# Patient Record
Sex: MALE | Race: WHITE | NOT HISPANIC OR LATINO | Employment: FULL TIME | URBAN - METROPOLITAN AREA
[De-identification: names, ages, dates, MRNs, and addresses within clinical notes are randomized per-mention and may not be internally consistent; named-entity substitution may affect disease eponyms.]

---

## 2019-09-08 ENCOUNTER — HOSPITAL ENCOUNTER (EMERGENCY)
Facility: HOSPITAL | Age: 34
Discharge: HOME | End: 2019-09-08
Attending: EMERGENCY MEDICINE | Admitting: EMERGENCY MEDICINE

## 2019-09-08 VITALS
TEMPERATURE: 97.5 F | SYSTOLIC BLOOD PRESSURE: 115 MMHG | BODY MASS INDEX: 28.04 KG/M2 | HEART RATE: 96 BPM | DIASTOLIC BLOOD PRESSURE: 74 MMHG | RESPIRATION RATE: 18 BRPM | HEIGHT: 68 IN | OXYGEN SATURATION: 100 % | WEIGHT: 185 LBS

## 2019-09-08 DIAGNOSIS — L02.91 ABSCESS: Primary | ICD-10-CM

## 2019-09-08 PROCEDURE — 87186 SC STD MICRODIL/AGAR DIL: CPT | Performed by: PHYSICIAN ASSISTANT

## 2019-09-08 PROCEDURE — 25010000002 MORPHINE PER 10 MG: Performed by: EMERGENCY MEDICINE

## 2019-09-08 PROCEDURE — 96374 THER/PROPH/DIAG INJ IV PUSH: CPT

## 2019-09-08 PROCEDURE — 87147 CULTURE TYPE IMMUNOLOGIC: CPT | Performed by: PHYSICIAN ASSISTANT

## 2019-09-08 PROCEDURE — 96375 TX/PRO/DX INJ NEW DRUG ADDON: CPT

## 2019-09-08 PROCEDURE — 99283 EMERGENCY DEPT VISIT LOW MDM: CPT

## 2019-09-08 PROCEDURE — 25010000002 ONDANSETRON PER 1 MG: Performed by: PHYSICIAN ASSISTANT

## 2019-09-08 PROCEDURE — 87070 CULTURE OTHR SPECIMN AEROBIC: CPT | Performed by: PHYSICIAN ASSISTANT

## 2019-09-08 PROCEDURE — 87205 SMEAR GRAM STAIN: CPT | Performed by: PHYSICIAN ASSISTANT

## 2019-09-08 RX ORDER — MORPHINE SULFATE 2 MG/ML
4 INJECTION, SOLUTION INTRAMUSCULAR; INTRAVENOUS ONCE
Status: COMPLETED | OUTPATIENT
Start: 2019-09-08 | End: 2019-09-08

## 2019-09-08 RX ORDER — SODIUM CHLORIDE 0.9 % (FLUSH) 0.9 %
10 SYRINGE (ML) INJECTION AS NEEDED
Status: DISCONTINUED | OUTPATIENT
Start: 2019-09-08 | End: 2019-09-08 | Stop reason: HOSPADM

## 2019-09-08 RX ORDER — ONDANSETRON 2 MG/ML
4 INJECTION INTRAMUSCULAR; INTRAVENOUS ONCE
Status: COMPLETED | OUTPATIENT
Start: 2019-09-08 | End: 2019-09-08

## 2019-09-08 RX ORDER — LIDOCAINE HYDROCHLORIDE AND EPINEPHRINE 10; 10 MG/ML; UG/ML
10 INJECTION, SOLUTION INFILTRATION; PERINEURAL ONCE
Status: COMPLETED | OUTPATIENT
Start: 2019-09-08 | End: 2019-09-08

## 2019-09-08 RX ADMIN — LIDOCAINE HYDROCHLORIDE AND EPINEPHRINE 10 ML: 10; 10 INJECTION, SOLUTION INFILTRATION; PERINEURAL at 16:25

## 2019-09-08 RX ADMIN — ONDANSETRON 4 MG: 2 INJECTION INTRAMUSCULAR; INTRAVENOUS at 16:24

## 2019-09-08 RX ADMIN — MORPHINE SULFATE 4 MG: 2 INJECTION, SOLUTION INTRAMUSCULAR; INTRAVENOUS at 16:24

## 2019-09-08 NOTE — ED PROVIDER NOTES
MD ATTESTATION NOTE    The YOHAN and I have discussed this patient's history, physical exam, and treatment plan.  I have reviewed the documentation and personally had a face to face interaction with the patient. I affirm the documentation and agree with the treatment and plan.  The attached note describes my personal findings.      History  34-year-old male with abscess to the right buttocks.    Physical Exam  Vital Signs reviewed  Alert, Well Appearing in NAD  There is a drained abscess to the right buttocks with mild amount of erythema.    Disposition  I&D performed by SONYA Roberts.  We will continue Bactrim for pain.  Return as needed.     Jordan Bah MD  09/08/19 9639

## 2019-09-08 NOTE — DISCHARGE INSTRUCTIONS
Change the bandage 2-3 times daily.  After 2 days, remove the packing. Wash hands, remove gently, and then wash hands again. Anything that drains from this area is considered infections/contagious.  Continue the Bactrim you were prescribed until gone  Recheck with your PCP in 2 days.  Return to the ER for fever >100.4, vomiting, spreading redness, increasing pain, any concerns.

## 2019-09-08 NOTE — ED TRIAGE NOTES
Pt reports painful knot on right buttocks near leg Thursday given bactrim Friday by PCP reports increased pain.

## 2019-09-08 NOTE — ED PROVIDER NOTES
EMERGENCY DEPARTMENT ENCOUNTER    Room Number:  30/30  Date of encounter:  9/8/2019  PCP: Juan Diego Mayberry MD  Historian: patient, family      HPI:  Chief Complaint: abscess  A complete HPI/ROS/PMH/PSH/SH/FH are unobtainable due to: nothing    Context: Vickey Sandhu is a 34 y.o. male who presents to the ED c/o abscess to his right buttock that started 4 days ago. Pt went to his PCP 3 days ago and was prescribed Bactrim. He reports moderate to severe dull/burning pain to the area and fever (100F). He denies CP, SOA, chills, abd pain and all other complaints at this time.       PAST MEDICAL HISTORY  Active Ambulatory Problems     Diagnosis Date Noted   • No Active Ambulatory Problems     Resolved Ambulatory Problems     Diagnosis Date Noted   • No Resolved Ambulatory Problems     No Additional Past Medical History         PAST SURGICAL HISTORY  History reviewed. No pertinent surgical history.      FAMILY HISTORY  History reviewed. No pertinent family history.      SOCIAL HISTORY  Social History     Socioeconomic History   • Marital status:      Spouse name: Not on file   • Number of children: Not on file   • Years of education: Not on file   • Highest education level: Not on file   Tobacco Use   • Smoking status: Never Smoker   Substance and Sexual Activity   • Alcohol use: Yes     Comment: occasionally   • Drug use: No   • Sexual activity: Defer         ALLERGIES  Patient has no known allergies.        REVIEW OF SYSTEMS  Review of Systems     All other ROS negative except as documented in HPI    PHYSICAL EXAM    I have reviewed the triage vital signs and nursing notes.    ED Triage Vitals [09/08/19 1431]   Temp Heart Rate Resp BP SpO2   97.5 °F (36.4 °C) 96 18 -- 100 %     GENERAL: not distressed  HENT: nares patent  EYES: no scleral icterus  CV: regular rhythm, regular rate  RESPIRATORY: normal effort, CTAB  ABDOMEN: soft, non-tender, non-distended  MUSCULOSKELETAL: no deformity  NEURO: alert, moves all  extremities, follows commands  SKIN: warm, dry, 8cm area to the right inferior buttock that is tender, indurated and erythematous with a palpable area of fluctuance. Does not involve the perineum.         PROCEDURES    Incision & Drainage  Date/Time: 9/8/2019 5:35 PM  Performed by: Martha Roberts PA  Authorized by: Jordan Bah MD     Consent:     Consent obtained:  Verbal    Consent given by:  Patient  Location:     Type:  Abscess    Location:  Anogenital    Anogenital location: right inferior buttock.  Pre-procedure details:     Skin preparation:  Betadine  Anesthesia (see MAR for exact dosages):     Anesthesia method:  Local infiltration    Local anesthetic:  Lidocaine 1% WITH epi  Procedure type:     Complexity:  Simple  Procedure details:     Incision types:  Stab incision    Incision depth:  Dermal    Scalpel blade:  11    Wound management:  Probed and deloculated    Drainage:  Purulent    Drainage amount:  Copious    Wound treatment:  Wound left open    Packing materials:  1/2 in iodoform gauze  Post-procedure details:     Patient tolerance of procedure:  Tolerated well, no immediate complications  Comments:      8cm area to the right inferior buttock that is tender, indurated and erythematous with a palpable area of fluctuance. Does not involve the perineum              MEDICATIONS GIVEN IN ER    Medications   sodium chloride 0.9 % flush 10 mL (not administered)   ondansetron (ZOFRAN) injection 4 mg (4 mg Intravenous Given 9/8/19 1624)   lidocaine-EPINEPHrine (XYLOCAINE W/EPI) 1 %-1:532902 injection 10 mL (10 mL Injection Given 9/8/19 1625)   morphine injection 4 mg (4 mg Intravenous Given 9/8/19 1624)         PROGRESS, DATA ANALYSIS, CONSULTS, AND MEDICAL DECISION MAKING    All labs have been independently reviewed by me.  All radiology studies have been reviewed by me and discussed with radiologist dictating report.   EKG's independently reviewed by me.  Discussion below represents my analysis of  pertinent findings related to patient's condition, differential diagnosis, treatment plan and final disposition.          --  1601. Ordered morphine and Zofran for pain.    1717. BP- 115/74 HR- 96 Temp- 97.5 °F (36.4 °C) (Tympanic) O2 sat- 100%  Rechecked the patient who is in NAD.       1753. Discussed case with Dr Bah. After a bedside evaluation, he agrees with the plan of care. Incision and drainage performed successfully and patient tolerated well. He is to continue the Bactrim as prescribed. Plan for follow up, home care, and indications for return discussed and he is discharged instable condition.      --  AS OF 6:18 PM VITALS:    BP - 115/74  HR - 96  TEMP - 97.5 °F (36.4 °C) (Tympanic)  02 SATS - 100%        DIAGNOSIS  Final diagnoses:   Abscess         DISPOSITION  DISCHARGE      FOLLOW-UP  Juan Diego Mayberry MD  [address] [facility] [address] [phone]    Call   As needed, If symptoms worsen    Margie Ramos MD  [address] [address] [phone]      general surgery (if needed)         Medication List      No changes were made to your prescriptions during this visit.       --  Documentation assistance provided by jabari Smallwood for SONYA Roberts.  Information recorded by the scribe was done at my direction and has been verified and validated by me.     Bella Smallwood  09/08/19 2024       Martha Roberts PA  09/10/19 3548

## 2019-09-10 LAB
BACTERIA SPEC AEROBE CULT: ABNORMAL
GRAM STN SPEC: ABNORMAL
GRAM STN SPEC: ABNORMAL